# Patient Record
Sex: MALE
[De-identification: names, ages, dates, MRNs, and addresses within clinical notes are randomized per-mention and may not be internally consistent; named-entity substitution may affect disease eponyms.]

---

## 2018-11-23 ENCOUNTER — HOSPITAL ENCOUNTER (EMERGENCY)
Dept: HOSPITAL 31 - C.ER | Age: 73
Discharge: HOME | End: 2018-11-23
Payer: MEDICARE

## 2018-11-23 VITALS — SYSTOLIC BLOOD PRESSURE: 128 MMHG | TEMPERATURE: 98.4 F | DIASTOLIC BLOOD PRESSURE: 84 MMHG | HEART RATE: 84 BPM

## 2018-11-23 VITALS — OXYGEN SATURATION: 97 %

## 2018-11-23 VITALS — RESPIRATION RATE: 20 BRPM

## 2018-11-23 DIAGNOSIS — R79.89: ICD-10-CM

## 2018-11-23 DIAGNOSIS — K59.00: Primary | ICD-10-CM

## 2018-11-23 DIAGNOSIS — K76.0: ICD-10-CM

## 2018-11-23 LAB
ALBUMIN SERPL-MCNC: 4.1 [, G/DL] (ref 3.5–5)
ALBUMIN/GLOB SERPL: 1.1 [,] (ref 1–2.1)
ALT SERPL-CCNC: 459 [, U/L] (ref 21–72)
AST SERPL-CCNC: 312 [, U/L] (ref 17–59)
BASOPHILS # BLD AUTO: 0 [, K/UL] (ref 0–0.2)
BASOPHILS NFR BLD: 0.5 [, %] (ref 0–2)
BILIRUB UR-MCNC: NEGATIVE [,]
BUN SERPL-MCNC: 25 [, MG/DL] (ref 9–20)
CALCIUM SERPL-MCNC: 9 [, MG/DL] (ref 8.6–10.4)
EOSINOPHIL # BLD AUTO: 0.1 [, K/UL] (ref 0–0.7)
EOSINOPHIL NFR BLD: 1.8 [, %] (ref 0–4)
ERYTHROCYTE [DISTWIDTH] IN BLOOD BY AUTOMATED COUNT: 13.9 [, %] (ref 11.5–14.5)
GFR NON-AFRICAN AMERICAN: 60 [,]
GLUCOSE UR STRIP-MCNC: NORMAL [, MG/DL]
HGB BLD-MCNC: 15 [, G/DL] (ref 12–18)
LEUKOCYTE ESTERASE UR-ACNC: (no result) [, LEU/UL]
LIPASE: 123 [, U/L] (ref 23–300)
LYMPHOCYTES # BLD AUTO: 1 [, K/UL] (ref 1–4.3)
LYMPHOCYTES NFR BLD AUTO: 17.7 [, %] (ref 20–40)
MCH RBC QN AUTO: 35 [, PG] (ref 27–31)
MCHC RBC AUTO-ENTMCNC: 35 [, G/DL] (ref 33–37)
MCV RBC AUTO: 99.9 [, FL] (ref 80–94)
MONOCYTES # BLD: 0.9 [, K/UL] (ref 0–0.8)
MONOCYTES NFR BLD: 15.3 [, %] (ref 0–10)
NEUTROPHILS # BLD: 3.6 [, K/UL] (ref 1.8–7)
NEUTROPHILS NFR BLD AUTO: 64.7 [, %] (ref 50–75)
NRBC BLD AUTO-RTO: 0.1 [, %] (ref 0–2)
PH UR STRIP: 5 [,] (ref 5–8)
PLATELET # BLD: 170 [, K/UL] (ref 130–400)
PMV BLD AUTO: 9.4 [, FL] (ref 7.2–11.7)
PROT UR STRIP-MCNC: NEGATIVE [, MG/DL]
RBC # BLD AUTO: 4.28 [, MIL/UL] (ref 4.4–5.9)
RBC # UR STRIP: NEGATIVE [,]
SP GR UR STRIP: 1.02 [,] (ref 1–1.03)
UROBILINOGEN UR-MCNC: 4 [, MG/DL] (ref 0.2–1)
WBC # BLD AUTO: 5.6 [, K/UL] (ref 4.8–10.8)

## 2018-11-23 PROCEDURE — 81001 URINALYSIS AUTO W/SCOPE: CPT

## 2018-11-23 PROCEDURE — 85025 COMPLETE CBC W/AUTO DIFF WBC: CPT

## 2018-11-23 PROCEDURE — 99285 EMERGENCY DEPT VISIT HI MDM: CPT

## 2018-11-23 PROCEDURE — 83690 ASSAY OF LIPASE: CPT

## 2018-11-23 PROCEDURE — 80053 COMPREHEN METABOLIC PANEL: CPT

## 2018-11-23 PROCEDURE — 74177 CT ABD & PELVIS W/CONTRAST: CPT

## 2018-11-23 NOTE — C.PDOC
History Of Present Illness


73 year old male with PMHx of HIV presents to the ED for evaluation of vague 

abdominal pain and back pain for 3 days. Reports he noticed blood on the tissue 

when he wipes after a bowel movement. Notes his stools are hard and "little 

balls". Last bowel movement was yesterday. Denies any nausea, vomiting, fever, 

chills, or urinary symptoms. Denies any trauma/injuries or heavy lifting.  

reports [id number] 400, vl unknown. 


Time Seen by Provider: 11/23/18 07:48


Chief Complaint (Nursing): GI Problem


History Per: Patient


History/Exam Limitations: no limitations


Onset/Duration Of Symptoms: Days (3)


Current Symptoms Are (Timing): Still Present


Location Of Pain/Discomfort: Diffuse


Associated Symptoms: Back Pain.  denies: Fever, Chills, Nausea, Vomiting, 

Diarrhea


Last Bowel Movement: Yesterday





Past Medical History


Reviewed: Historical Data, Nursing Documentation, Vital Signs


Vital Signs: 





                                Last Vital Signs











Temp  98.7 F   11/23/18 07:34


 


Pulse  87   11/23/18 07:34


 


Resp  20   11/23/18 07:34


 


BP  111/74   11/23/18 07:34


 


Pulse Ox  97   11/23/18 07:34














- Medical History


PMH: HIV


Surgical History: Appendectomy


Other Surgeries: Stomach ulcer surgery


Family History: States: No Known Family Hx





- Social History


Hx Alcohol Use: No


Hx Substance Use: No





- Immunization History


Hx Tetanus Toxoid Vaccination: No


Hx Influenza Vaccination: No


Hx Pneumococcal Vaccination: No





Review Of Systems


Constitutional: Negative for: Fever, Chills


Gastrointestinal: Positive for: Abdominal Pain.  Negative for: Nausea, Vomiting,

Diarrhea


Genitourinary: Negative for: Dysuria, Hematuria


Musculoskeletal: Positive for: Back Pain





Physical Exam





- Physical Exam


Appears: Non-toxic, No Acute Distress


Skin: Warm, Dry, No Rash


Head: Atraumatic, Normacephalic


Eye(s): bilateral: Normal Inspection


Nose: Normal


Oral Mucosa: Moist


Neck: Normal ROM, Supple


Cardiovascular: Rhythm Regular, No Murmur


Respiratory: No Rales, No Rhonchi, No Wheezing


Gastrointestinal/Abdominal: Soft, Tenderness (mild LLQ tenderness), No Guarding,

No Rebound, Other (well healed vertical and horizontal scars on the RLQ )


Back: No CVA Tenderness


Extremity: Bilateral: Atraumatic, Normal Color And Temperature, Normal ROM


Neurological/Psych: Oriented x3, Normal Speech


Gait: Steady





ED Course And Treatment





- Laboratory Results


Result Diagrams: 


                                 11/23/18 08:18





                                 11/23/18 08:18


O2 Sat by Pulse Oximetry: 97 (RA)


Pulse Ox Interpretation: Normal





- CT Scan/US


  ** CT abd/pel


Other Rad Studies (CT/US): Read By Radiologist, Radiology Report Reviewed


CT/US Interpretation: IMPRESSION:  Mild-to-moderate constipation.  The oral 

contrast reached the hepatic flexure.  Status post cholecystectomy.  No evidence

of pancreatitis or appendicitis.  Distal stomach wall thickening suspicious for 

gastritis.





Medical Decision Making


Medical Decision Making: 


Plan


- CT abd/pel


- Bloodwork 


- UA 








1132  results of ct scan discussed with patient in Swedish- finding of pulm 

nodule (pmd f/u), enlarged prostate (f/u urology), thickening gastric area and 

hepatic steatosis (gi f/u), constipation.  pt understands.  d/c home with colace

and pepcid.  





Disposition


Counseled Patient/Family Regarding: Studies Performed, Diagnosis, Need For 

Followup, Rx Given





- Disposition


Referrals: 


Miguel Pizarro MD [Staff Provider] - 


Benjie Martin MD [Staff Provider] - 


Disposition: HOME/ ROUTINE


Disposition Time: 11:40


Condition: GOOD


Additional Instructions: 


Woods tomografa computarizada muestra un ndulo pulmonar. Ricky un seguimiento con 

woods mdico acerca de esto. Existen hallazgos en la tomografa computarizada (TC) 

y en los anlisis de franci anormales de la funcin heptica que debe realizar 

un seguimiento con el Dr. Rothman (gastroenterlogo) y monica Pepcid and COlace. 

Y es necesario hacer un seguimiento con el Dr. Martin (urlogo) para la 

prstata agrandada y con el mdico de atencin primaria. Aumentar la hidratacin

y la fibra en la dieta.





Your ct scan shows pulmonary nodule/. Follow up with your doctor about this. 

THere are findings on ct scan and abnormal liver funcitn blood tests that you 

need to follow up with Dr Rothman (gastroenterologist) and take Pepcid and 

COlace. ANd need to follow up with Dr Martin (urologist) for enlarged prostate 

and with Primary care doctor.  Increase hydration and fiber in diet.   


Prescriptions: 


Docusate Sodium [Colace] 100 mg PO BID #60 capsule


Famotidine [Pepcid] 20 mg PO DAILY #14 tab


Instructions:  Constipation, Adult (DC), Nonalcoholic Fatty Liver Disease (DC)


Forms:  Gen Discharge Inst Czech, [facility] (Czech)


Print Language: Danish





- Clinical Impression


Clinical Impression: 


 Constipation, Abnormal liver function tests, Hepatic steatosis








- PA / NP / Resident Statement


MD/DO has reviewed & agrees with the documentation as recorded.





- Scribe Statement


The provider has reviewed the documentation as recorded by the Scribe


Estefania Chacko








All medical record entries made by the Minhibjolie were at my direction and 

personally dictated by me. I have reviewed the chart and agree that the record 

accurately reflects my personal performance of the history, physical exam, 

medical decision making, and the department course for this patient. I have also

 personally directed, reviewed, and agree with the discharge instructions and 

disposition.

## 2018-11-23 NOTE — CT
Date of service: 



11/23/2018



PROCEDURE:  CT Abdomen and Pelvis with contrast



HISTORY:

abd pain, black stool



COMPARISON:

None.



TECHNIQUE:

Contrast dose: 100 mL of Visipaque 320 intravenously.



Axial and reformatted coronal and sagittal CT images of the abdomen 

and pelvis were obtained after IV and oral contrast administration P



Radiation dose:



Total exam DLP = 454.58 mGy-cm.



This CT exam was performed using one or more of the following dose 

reduction techniques: Automated exposure control, adjustment of the 

mA and/or kV according to patient size, and/or use of iterative 

reconstruction technique.



FINDINGS:



LOWER THORAX:

No evidence of acute pathology or pleural effusion.  There is 3 

millimeter pleural base nodule at left lung base likely represent a 

sequela of prior infection or inflammatory process.  There is a small 

hiatus hernia.  The heart is normal in size. 



LIVER:

Mild-to-moderate hepatic steatosis is noted.  No gross lesion or 

ductal dilatation. 



GALLBLADDER AND BILE DUCTS:

Status post cholecystectomy 



PANCREAS:

Unremarkable. No gross lesion or ductal dilatation.



SPLEEN:

Unremarkable. 



ADRENALS:

Unremarkable. No mass. 



KIDNEYS AND URETERS:

Mildly dilated collecting system of both kidneys likely due to mildly 

distended urinary bladder.  No evidence of obstructing calculus. No 

solid mass. 



VASCULATURE:

Unremarkable. No aortic aneurysm. No significant aortic 

atherosclerotic calcification or mural plaque present.



BOWEL:

Mild-to-moderate distal gastric wall thickening may represent 

gastritis.  No obstruction. No gross mural thickening. Mild 

constipation.



APPENDIX:

No evidence of appendicitis. 



PERITONEUM:

Unremarkable. No free fluid. No free air. 



LYMPH NODES:

Unremarkable. No enlarged lymph nodes. 



BLADDER:

The urinary bladder is mildly distended. 



REPRODUCTIVE:

The prostate is moderately enlarged. 



BONES:

No acute fracture. 



OTHER FINDINGS:

None.



IMPRESSION:

Mild-to-moderate constipation.  The oral contrast reached the hepatic 

flexure.



Status post cholecystectomy.  No evidence of pancreatitis or 

appendicitis.



Distal stomach wall thickening suspicious for gastritis.